# Patient Record
Sex: MALE | Race: WHITE | Employment: OTHER | RURAL
[De-identification: names, ages, dates, MRNs, and addresses within clinical notes are randomized per-mention and may not be internally consistent; named-entity substitution may affect disease eponyms.]

---

## 2020-08-07 ENCOUNTER — OFFICE VISIT (OUTPATIENT)
Dept: PRIMARY CARE CLINIC | Age: 60
End: 2020-08-07

## 2020-08-07 VITALS — TEMPERATURE: 99.6 F | RESPIRATION RATE: 18 BRPM

## 2020-08-07 RX ORDER — GREEN TEA/HOODIA GORDONII 315-12.5MG
1 CAPSULE ORAL DAILY
Qty: 30 TABLET | Refills: 0 | Status: SHIPPED | OUTPATIENT
Start: 2020-08-07 | End: 2020-08-10

## 2020-08-07 RX ORDER — SULFAMETHOXAZOLE AND TRIMETHOPRIM 800; 160 MG/1; MG/1
1 TABLET ORAL 2 TIMES DAILY
Qty: 14 TABLET | Refills: 0 | Status: SHIPPED | OUTPATIENT
Start: 2020-08-07 | End: 2020-08-10

## 2020-08-07 ASSESSMENT — ENCOUNTER SYMPTOMS
EYES NEGATIVE: 1
ALLERGIC/IMMUNOLOGIC NEGATIVE: 1
RESPIRATORY NEGATIVE: 1
GASTROINTESTINAL NEGATIVE: 1

## 2020-08-07 ASSESSMENT — PATIENT HEALTH QUESTIONNAIRE - PHQ9: DEPRESSION UNABLE TO ASSESS: URGENT/EMERGENT SITUATION

## 2020-08-07 NOTE — PROGRESS NOTES
Pt co cyst on left upper shoulder x 5 days.
Rk Douglas MD   metformin (GLUCOPHAGE) 1000 MG tablet Take 1 tablet by mouth 2 times daily (with meals). TSERING Joel   insulin glargine (LANTUS) 100 UNIT/ML injection 120 units in the pm and 100 units in the am.  TSERING Joel   ULTRA-COMFORT INSULIN SYRINGE 31G X 5/16\" 1 ML MISC ONE MISCELLANEOUS FOUR TIMES A DAY  TSERING Joel   PROTONIX 40 MG tablet TAKE ONE TABLET BY MOUTH EVERY DAY  Rk Douglas MD   allopurinol (ZYLOPRIM) 100 MG tablet TAKE ONE TABLET BY MOUTH TWICE A DAY  TSERING Joel   gabapentin (NEURONTIN) 600 MG tablet Take 1 tablet by mouth 3 times daily as needed for Pain. TSERING Joel   albuterol (PROVENTIL HFA) 108 (90 BASE) MCG/ACT inhaler Inhale 2 puffs into the lungs every 6 hours as needed for Wheezing. TSERING Joel   HYDROcodone-acetaminophen (LORTAB 10)  MG per tablet Take 1 tablet by mouth every 6 hours as needed for Pain. TSERING Joel   Vilazodone HCl (VILAZODONE HCL) 20 MG TABS Take 20 mg by mouth daily. TSERING Joel   carisoprodol (SOMA) 350 MG tablet Take 1 tablet by mouth 2 times daily as needed. Rk Douglas MD   Insulin Syringe-Needle U-100 27G X 5/8\" 1 ML MISC by Does not apply route 4 times daily. Dx: dm2= 250.00  TSERING Joel   Insulin Zinc Human (NOVOLIN L SC) Inject 30 Units into the skin 2 times daily. Historical Provider, MD   aspirin 325 MG tablet Take 325 mg by mouth daily. Historical Provider, MD        Social History     Tobacco Use    Smoking status: Current Every Day Smoker     Packs/day: 0.50     Years: 30.00     Pack years: 15.00    Smokeless tobacco: Never Used   Substance Use Topics    Alcohol use: No        Vitals:    08/07/20 1322   Resp: 18   Temp: 99.6 °F (37.6 °C)   TempSrc: Oral     Estimated body mass index is 39.68 kg/m² as calculated from the following:    Height as of 1/28/13: 5' 8\" (1.727 m). Weight as of 1/28/13: 261 lb (118.4 kg).     Physical Exam  Vitals